# Patient Record
Sex: FEMALE | Race: BLACK OR AFRICAN AMERICAN | NOT HISPANIC OR LATINO | Employment: FULL TIME | URBAN - METROPOLITAN AREA
[De-identification: names, ages, dates, MRNs, and addresses within clinical notes are randomized per-mention and may not be internally consistent; named-entity substitution may affect disease eponyms.]

---

## 2022-03-03 ENCOUNTER — CLINICAL SUPPORT (OUTPATIENT)
Dept: FAMILY MEDICINE CLINIC | Facility: CLINIC | Age: 22
End: 2022-03-03

## 2022-03-03 DIAGNOSIS — Z23 IMMUNIZATION DUE: Primary | ICD-10-CM

## 2022-03-03 PROCEDURE — 0001A COVID-19 (PFIZER) 12+ YRS: CPT | Performed by: FAMILY MEDICINE

## 2022-03-03 PROCEDURE — 91305 COVID-19 (PFIZER) 12+ YRS: CPT | Performed by: FAMILY MEDICINE

## 2022-03-24 ENCOUNTER — IMMUNIZATION (OUTPATIENT)
Dept: FAMILY MEDICINE CLINIC | Facility: CLINIC | Age: 22
End: 2022-03-24

## 2022-03-24 DIAGNOSIS — Z23 IMMUNIZATION DUE: Primary | ICD-10-CM

## 2022-03-24 PROCEDURE — 91305 COVID-19 (PFIZER) 12+ YRS: CPT | Performed by: FAMILY MEDICINE

## 2022-03-24 PROCEDURE — 0052A COVID-19 (PFIZER) 12+ YRS: CPT | Performed by: FAMILY MEDICINE

## 2022-07-19 PROCEDURE — 87661 TRICHOMONAS VAGINALIS AMPLIF: CPT | Performed by: FAMILY MEDICINE

## 2022-07-19 PROCEDURE — 87491 CHLMYD TRACH DNA AMP PROBE: CPT | Performed by: FAMILY MEDICINE

## 2022-07-19 PROCEDURE — 87798 DETECT AGENT NOS DNA AMP: CPT | Performed by: FAMILY MEDICINE

## 2022-07-19 PROCEDURE — 87591 N.GONORRHOEAE DNA AMP PROB: CPT | Performed by: FAMILY MEDICINE

## 2022-07-19 PROCEDURE — 87801 DETECT AGNT MULT DNA AMPLI: CPT | Performed by: FAMILY MEDICINE

## 2024-01-21 PROBLEM — Z72.51 HIGH RISK HETEROSEXUAL BEHAVIOR: Status: ACTIVE | Noted: 2024-01-21

## 2024-02-27 ENCOUNTER — OFFICE VISIT (OUTPATIENT)
Dept: FAMILY MEDICINE CLINIC | Facility: CLINIC | Age: 24
End: 2024-02-27
Payer: COMMERCIAL

## 2024-02-27 ENCOUNTER — LAB (OUTPATIENT)
Dept: LAB | Facility: HOSPITAL | Age: 24
End: 2024-02-27
Payer: COMMERCIAL

## 2024-02-27 VITALS
SYSTOLIC BLOOD PRESSURE: 110 MMHG | DIASTOLIC BLOOD PRESSURE: 62 MMHG | HEIGHT: 65 IN | BODY MASS INDEX: 23.03 KG/M2 | HEART RATE: 98 BPM | WEIGHT: 138.2 LBS | TEMPERATURE: 98.2 F | OXYGEN SATURATION: 98 %

## 2024-02-27 DIAGNOSIS — Z11.3 SCREENING EXAMINATION FOR STD (SEXUALLY TRANSMITTED DISEASE): Primary | ICD-10-CM

## 2024-02-27 DIAGNOSIS — F33.2 SEVERE EPISODE OF RECURRENT MAJOR DEPRESSIVE DISORDER, WITHOUT PSYCHOTIC FEATURES: ICD-10-CM

## 2024-02-27 DIAGNOSIS — Z11.3 SCREENING EXAMINATION FOR STD (SEXUALLY TRANSMITTED DISEASE): ICD-10-CM

## 2024-02-27 LAB
DEPRECATED RDW RBC AUTO: 40.2 FL (ref 37–54)
ERYTHROCYTE [DISTWIDTH] IN BLOOD BY AUTOMATED COUNT: 12.4 % (ref 12.3–15.4)
HCT VFR BLD AUTO: 35.7 % (ref 34–46.6)
HGB BLD-MCNC: 12.1 G/DL (ref 12–15.9)
MCH RBC QN AUTO: 30.3 PG (ref 26.6–33)
MCHC RBC AUTO-ENTMCNC: 33.9 G/DL (ref 31.5–35.7)
MCV RBC AUTO: 89.3 FL (ref 79–97)
PLATELET # BLD AUTO: 243 10*3/MM3 (ref 140–450)
PMV BLD AUTO: 10.8 FL (ref 6–12)
RBC # BLD AUTO: 4 10*6/MM3 (ref 3.77–5.28)
WBC NRBC COR # BLD AUTO: 7 10*3/MM3 (ref 3.4–10.8)

## 2024-02-27 PROCEDURE — 87798 DETECT AGENT NOS DNA AMP: CPT | Performed by: FAMILY MEDICINE

## 2024-02-27 PROCEDURE — 86695 HERPES SIMPLEX TYPE 1 TEST: CPT

## 2024-02-27 PROCEDURE — 86592 SYPHILIS TEST NON-TREP QUAL: CPT

## 2024-02-27 PROCEDURE — 84443 ASSAY THYROID STIM HORMONE: CPT

## 2024-02-27 PROCEDURE — 85027 COMPLETE CBC AUTOMATED: CPT

## 2024-02-27 PROCEDURE — 80053 COMPREHEN METABOLIC PANEL: CPT

## 2024-02-27 PROCEDURE — 86696 HERPES SIMPLEX TYPE 2 TEST: CPT

## 2024-02-27 PROCEDURE — 87591 N.GONORRHOEAE DNA AMP PROB: CPT | Performed by: FAMILY MEDICINE

## 2024-02-27 PROCEDURE — 87491 CHLMYD TRACH DNA AMP PROBE: CPT | Performed by: FAMILY MEDICINE

## 2024-02-27 PROCEDURE — 36415 COLL VENOUS BLD VENIPUNCTURE: CPT

## 2024-02-27 PROCEDURE — 80074 ACUTE HEPATITIS PANEL: CPT

## 2024-02-27 PROCEDURE — G0432 EIA HIV-1/HIV-2 SCREEN: HCPCS

## 2024-02-27 PROCEDURE — 87661 TRICHOMONAS VAGINALIS AMPLIF: CPT | Performed by: FAMILY MEDICINE

## 2024-02-27 PROCEDURE — 87801 DETECT AGNT MULT DNA AMPLI: CPT | Performed by: FAMILY MEDICINE

## 2024-02-27 NOTE — ASSESSMENT & PLAN NOTE
Will obtain STI screening today with new swab and laboratory evaluation.  Patient denies any active symptoms.

## 2024-02-27 NOTE — ASSESSMENT & PLAN NOTE
Patient's depression is a recurrent episode that is severe without psychosis. Depression is active and newly identified.    Plan:   Discussed medication and talk therapy options, patient declines at this time.  Patient denies any active suicidal ideation at this time.  Will provide patient with information for crisis line.    Followup in 3 months.

## 2024-02-27 NOTE — PROGRESS NOTES
New Patient Office Visit      Date: 2024   Patient Name: Estefania Giraldo  : 2000   MRN: [id number]     Chief Complaint:    Chief Complaint   Patient presents with    Establish Care    Exposure to STD       History of Present Illness: Estefania Giraldo is a 23 y.o. female who is here today to establish care.  She currently lives at home with her sister.  She reports she is currently unemployed.    Patient presents today for STI screening.  Patient does have a history of testing positive for bacterial vaginosis, gonorrhea back in September.  She did complete treatment that time and had negative testing completed in January.  She states that she has had no symptoms since that time but she does remain sexually active and would like rescreening today.  She denies any active lesions.    On intake screening patient did flag positive for suicidal thoughts or ideations.  Discussed further with patient at this visit.  She states that she is currently having ups and downs with her relationship with her mother.  She states at times able to cause her stress.  Discussed options including medication or talk therapy, patient declines these at this time.  Further discussed crisis line with patient and availability for  care if suicidal ideation were to worsen, patient voiced understanding.    Patient denies any family history of cancer.  She states she is up-to-date on Pap smear.  She reports [facility]  She states at that time she has had only spotting and irregular periods.  She is a  and had a full-term vaginal delivery 2 years ago.  She reports she did have some low iron during pregnancy but no hypertension or hypoglycemia.  Patient does have tattoos.  She declines HPV vaccine today.    Subjective      Review of Systems:   Review of Systems   All other systems reviewed and are negative.      Past Medical History:   Past Medical History:   Diagnosis  "Date    Gonorrhea 2023    Trichomonal vaginitis 2023       Past Surgical History: History reviewed. No pertinent surgical history.    Family History:   Family History   Family history unknown: Yes       Social History:   Social History     Socioeconomic History    Marital status: Single   Tobacco Use    Smoking status: Never    Smokeless tobacco: Never   Vaping Use    Vaping Use: Never used   Substance and Sexual Activity    Alcohol use: Yes    Drug use: Yes     Types: Marijuana    Sexual activity: Defer       Medications:   No current outpatient medications on file.    Allergies:   No Known Allergies    Objective     Physical Exam:  Vital Signs:   Vitals:    02/27/24 1415   BP: 110/62   Pulse: 98   Temp: 98.2 °F (36.8 °C)   TempSrc: Temporal   SpO2: 98%   Weight: 62.7 kg (138 lb 3.2 oz)   Height: 165.1 cm (65\")   PainSc: 0-No pain     Body mass index is 23 kg/m².  BMI is within normal parameters. No other follow-up for BMI required.       Physical Exam  Vitals and nursing note reviewed.   Constitutional:       Appearance: Normal appearance. She is normal weight.   HENT:      Head: Normocephalic and atraumatic.      Nose: Nose normal.      Mouth/Throat:      Mouth: Mucous membranes are moist.   Eyes:      Extraocular Movements: Extraocular movements intact.      Pupils: Pupils are equal, round, and reactive to light.   Cardiovascular:      Rate and Rhythm: Normal rate and regular rhythm.      Heart sounds: Normal heart sounds.   Pulmonary:      Effort: Pulmonary effort is normal.      Breath sounds: Normal breath sounds.   Abdominal:      General: Abdomen is flat.   Musculoskeletal:         General: Normal range of motion.      Cervical back: Normal range of motion.   Skin:     General: Skin is warm.   Neurological:      General: No focal deficit present.      Mental Status: She is alert and oriented to person, place, and time.   Psychiatric:         Mood and Affect: Mood normal.         Behavior: Behavior normal.   "       Thought Content: Thought content normal.         Judgment: Judgment normal.         Procedures    Results:   PHQ-9 Total Score: 15            Assessment / Plan      Assessment/Plan:   Diagnoses and all orders for this visit:    1. Screening examination for STD (sexually transmitted disease) (Primary)  Assessment & Plan:  Will obtain STI screening today with new swab and laboratory evaluation.  Patient denies any active symptoms.    Orders:  -     Hepatitis panel, acute; Future  -     HIV-1/O/2 ANTIGEN/ANTIBODY, 4TH GENERATION; Future  -     RPR; Future  -     NuSwab VG+ - Swab, Vagina; Future  -     CBC (No Diff); Future  -     Comprehensive Metabolic Panel; Future  -     TSH; Future  -     NuSwab VG+ - Swab, Vagina    2. Severe episode of recurrent major depressive disorder, without psychotic features  Assessment & Plan:  Patient's depression is a recurrent episode that is severe without psychosis. Depression is active and newly identified.    Plan:   Discussed medication and talk therapy options, patient declines at this time.  Patient denies any active suicidal ideation at this time.  Will provide patient with information for crisis line.    Followup in 3 months.            Follow Up:   Return in about 3 months (around 5/27/2024) for Annual.    Twila Rojas DO   [facility]

## 2024-02-28 ENCOUNTER — TELEPHONE (OUTPATIENT)
Dept: FAMILY MEDICINE CLINIC | Facility: CLINIC | Age: 24
End: 2024-02-28
Payer: COMMERCIAL

## 2024-02-28 DIAGNOSIS — Z11.3 SCREENING EXAMINATION FOR STD (SEXUALLY TRANSMITTED DISEASE): Primary | ICD-10-CM

## 2024-02-28 LAB
ALBUMIN SERPL-MCNC: 4.6 G/DL (ref 3.5–5.2)
ALBUMIN/GLOB SERPL: 1.5 G/DL
ALP SERPL-CCNC: 49 U/L (ref 39–117)
ALT SERPL W P-5'-P-CCNC: 14 U/L (ref 1–33)
ANION GAP SERPL CALCULATED.3IONS-SCNC: 12.7 MMOL/L (ref 5–15)
AST SERPL-CCNC: 17 U/L (ref 1–32)
BILIRUB SERPL-MCNC: 0.3 MG/DL (ref 0–1.2)
BUN SERPL-MCNC: 8 MG/DL (ref 6–20)
BUN/CREAT SERPL: 11.3 (ref 7–25)
CALCIUM SPEC-SCNC: 9.4 MG/DL (ref 8.6–10.5)
CHLORIDE SERPL-SCNC: 103 MMOL/L (ref 98–107)
CO2 SERPL-SCNC: 21.3 MMOL/L (ref 22–29)
CREAT SERPL-MCNC: 0.71 MG/DL (ref 0.57–1)
EGFRCR SERPLBLD CKD-EPI 2021: 123.5 ML/MIN/1.73
GLOBULIN UR ELPH-MCNC: 3.1 GM/DL
GLUCOSE SERPL-MCNC: 73 MG/DL (ref 65–99)
HAV IGM SERPL QL IA: NORMAL
HBV CORE IGM SERPL QL IA: NORMAL
HBV SURFACE AG SERPL QL IA: NORMAL
HCV AB SER DONR QL: NORMAL
HIV 1+2 AB+HIV1 P24 AG SERPL QL IA: NORMAL
POTASSIUM SERPL-SCNC: 3.8 MMOL/L (ref 3.5–5.2)
PROT SERPL-MCNC: 7.7 G/DL (ref 6–8.5)
RPR SER QL: NORMAL
SODIUM SERPL-SCNC: 137 MMOL/L (ref 136–145)
TSH SERPL DL<=0.05 MIU/L-ACNC: 0.42 UIU/ML (ref 0.27–4.2)

## 2024-02-28 NOTE — TELEPHONE ENCOUNTER
Caller: Estefania Giraldo    Relationship: Self    Best call back number: [phone]    Caller requesting test results: SELF    What test was performed: LABS    When was the test performed: 02/27/2024    Additional notes: PATIENT STATED SHE WANTED TO SPECIFICALLY TEST FOR HERPES AND DID NOT SEE THE RESULTS IN MY CHART. PLEASE ADVISE

## 2024-02-28 NOTE — TELEPHONE ENCOUNTER
As patient was asymptomatic during exam, routine screening for Herpes was not indicated.  If she is not having an active flare this will likely not show on the [facility].  If she would like to have a blood test to show if she has had a past exposure, I can order this.  I have placed a standing order in her chart and she can come have this blood work done at her convenience.  Unfortunately I am unable to add it to her previous lab draw.

## 2024-02-29 ENCOUNTER — PATIENT ROUNDING (BHMG ONLY) (OUTPATIENT)
Dept: FAMILY MEDICINE CLINIC | Facility: CLINIC | Age: 24
End: 2024-02-29
Payer: COMMERCIAL

## 2024-02-29 ENCOUNTER — TELEPHONE (OUTPATIENT)
Dept: FAMILY MEDICINE CLINIC | Facility: CLINIC | Age: 24
End: 2024-02-29
Payer: COMMERCIAL

## 2024-02-29 NOTE — TELEPHONE ENCOUNTER
Advised patient again that the lab orders are already in the chart she would have to stop back by the lab. No further questions or concerns.

## 2024-02-29 NOTE — TELEPHONE ENCOUNTER
Caller: Estefania Giraldo    Relationship: Self    Best call back number: [phone]    What orders are you requesting (i.e. lab or imaging): HERPES    In what timeframe would the patient need to come in: ASAP    Where will you receive your lab/imaging services: IN OFFICE    Additional notes: PATIENT WOULD LIKE TO BE TESTED FOR HERPES. PLEASE PLACE ORDER AND CALL PATIENT BACK

## 2024-03-01 LAB
A VAGINAE DNA VAG QL NAA+PROBE: ABNORMAL SCORE
BVAB2 DNA VAG QL NAA+PROBE: ABNORMAL SCORE
C ALBICANS DNA VAG QL NAA+PROBE: NEGATIVE
C GLABRATA DNA VAG QL NAA+PROBE: NEGATIVE
C TRACH DNA VAG QL NAA+PROBE: NEGATIVE
MEGA1 DNA VAG QL NAA+PROBE: ABNORMAL SCORE
N GONORRHOEA DNA VAG QL NAA+PROBE: POSITIVE
T VAGINALIS DNA VAG QL NAA+PROBE: NEGATIVE

## 2024-03-02 ENCOUNTER — TELEPHONE (OUTPATIENT)
Dept: FAMILY MEDICINE CLINIC | Facility: CLINIC | Age: 24
End: 2024-03-02
Payer: COMMERCIAL

## 2024-03-02 LAB
HSV1 IGG SER IA-ACNC: <0.91 INDEX (ref 0–0.9)
HSV2 IGG SER IA-ACNC: 12.7 INDEX (ref 0–0.9)

## 2024-03-02 NOTE — TELEPHONE ENCOUNTER
Per Dr Griffiths she reviewed pts lab results. And pt was informed that we will put in orders on Monday.

## 2024-03-04 ENCOUNTER — CLINICAL SUPPORT (OUTPATIENT)
Dept: FAMILY MEDICINE CLINIC | Facility: CLINIC | Age: 24
End: 2024-03-04
Payer: COMMERCIAL

## 2024-03-04 DIAGNOSIS — Z72.51 HIGH RISK HETEROSEXUAL BEHAVIOR: Primary | ICD-10-CM

## 2024-03-04 DIAGNOSIS — N76.0 BACTERIAL VAGINOSIS: ICD-10-CM

## 2024-03-04 DIAGNOSIS — A54.9 GONORRHEA: Primary | ICD-10-CM

## 2024-03-04 DIAGNOSIS — B96.89 BACTERIAL VAGINOSIS: ICD-10-CM

## 2024-03-04 RX ORDER — CEFTRIAXONE 1 G/1
1 INJECTION, POWDER, FOR SOLUTION INTRAMUSCULAR; INTRAVENOUS ONCE
Status: COMPLETED | OUTPATIENT
Start: 2024-03-04 | End: 2024-03-04

## 2024-03-04 RX ORDER — METRONIDAZOLE 500 MG/1
500 TABLET ORAL 2 TIMES DAILY
Qty: 14 TABLET | Refills: 0 | Status: SHIPPED | OUTPATIENT
Start: 2024-03-04 | End: 2024-03-11

## 2024-03-04 RX ADMIN — CEFTRIAXONE 1 G: 1 INJECTION, POWDER, FOR SOLUTION INTRAMUSCULAR; INTRAVENOUS at 12:58

## 2024-03-07 ENCOUNTER — TELEPHONE (OUTPATIENT)
Dept: FAMILY MEDICINE CLINIC | Facility: CLINIC | Age: 24
End: 2024-03-07
Payer: COMMERCIAL

## 2024-03-07 NOTE — TELEPHONE ENCOUNTER
Caller: Estefania Giraldo     Relationship:     Best call back number: [phone]    What is your medical concern? HERPES/TEST RESULTS    How long has this issue been going on? SAW RESULTS MYCHART    Is your provider already aware of this issue? NOT SURE IF SHE'S SEEN THEM.    Have you been treated for this issue? NO. PLEASE CALL ASAP. PATIENT IS VERY CONCERNED.

## 2024-03-07 NOTE — TELEPHONE ENCOUNTER
Called patient back and discussed results.  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  As patient is not having any current symptoms, would not recommend antiviral treatment at this time.  Advised patient if she were to have a flare of symptoms to reach out to be in antiviral treatment.  Unfortunately, patient had no prior HSV screening so unable to inform her when exposure occurred.  Did confirm with patient that she did complete treatment for gonorrhea and she is currently completing a course of Flagyl for bacterial vaginosis.  Advised patient if symptoms persist once completing oral antibiotics to return to clinic for repeat testing.  If patient does remain asymptomatic, advised patient to return to clinic in 3 months for follow-up testing.

## 2025-04-30 PROCEDURE — 87591 N.GONORRHOEAE DNA AMP PROB: CPT | Performed by: FAMILY MEDICINE

## 2025-04-30 PROCEDURE — 87661 TRICHOMONAS VAGINALIS AMPLIF: CPT | Performed by: FAMILY MEDICINE

## 2025-04-30 PROCEDURE — 87801 DETECT AGNT MULT DNA AMPLI: CPT | Performed by: FAMILY MEDICINE

## 2025-04-30 PROCEDURE — 87798 DETECT AGENT NOS DNA AMP: CPT | Performed by: FAMILY MEDICINE

## 2025-04-30 PROCEDURE — 87491 CHLMYD TRACH DNA AMP PROBE: CPT | Performed by: FAMILY MEDICINE

## 2025-05-05 ENCOUNTER — RESULTS FOLLOW-UP (OUTPATIENT)
Dept: URGENT CARE | Facility: CLINIC | Age: 25
End: 2025-05-05

## 2025-05-05 NOTE — TELEPHONE ENCOUNTER
Spoke with patient, she stated [phone] is the best # to reach her.  She understands and is good to go on the Rx for Tx.  Pharmacy also confirmed.    ----- Message from Maico Parry sent at 5/4/2025  9:15 AM EDT -----  Please try to get in contact with the patient.  I called but could not get through.  Please try other methods that we have at our disposal so we can contact the patient about positive trichomonas and   chlamydia.  She will need treatment for this which I can discuss.  ----- Message -----  From: Lab, Background User  Sent: 5/3/2025   7:09 PM EDT  To:  [address] Asap Post Discharge Resul#

## 2025-05-07 ENCOUNTER — TELEPHONE (OUTPATIENT)
Dept: URGENT CARE | Facility: CLINIC | Age: 25
End: 2025-05-07

## 2025-05-07 DIAGNOSIS — A74.9 CHLAMYDIA: Primary | ICD-10-CM

## 2025-05-07 DIAGNOSIS — B96.89 BACTERIAL VAGINOSIS: ICD-10-CM

## 2025-05-07 DIAGNOSIS — A59.9 TRICHOMONAS INFECTION: ICD-10-CM

## 2025-05-07 DIAGNOSIS — N76.0 BACTERIAL VAGINOSIS: ICD-10-CM

## 2025-05-07 RX ORDER — METRONIDAZOLE 500 MG/1
500 TABLET ORAL 2 TIMES DAILY
Qty: 14 TABLET | Refills: 0 | Status: SHIPPED | OUTPATIENT
Start: 2025-05-07 | End: 2025-05-14

## 2025-05-07 RX ORDER — DOXYCYCLINE 100 MG/1
100 CAPSULE ORAL 2 TIMES DAILY
Qty: 14 CAPSULE | Refills: 0 | Status: SHIPPED | OUTPATIENT
Start: 2025-05-07 | End: 2025-05-14

## 2025-05-07 NOTE — TELEPHONE ENCOUNTER
Patient states recent testing indicates positive social infection result and she wants to know if and when medication will be sent to pharmacy for treatment.  Please advise.

## 2025-05-07 NOTE — TELEPHONE ENCOUNTER
Spoke with patient.  Advised her of positive trichomonas, chlamydia and BV diagnosis.  Patient has no drug allergies that she knows of.  Last menstrual cycle was 5 days ago and she is not concerned for pregnancy.  Will send doxycycline and metronidazole to the pharmacy.  Advised of increased risk for sunburn with doxycycline, and to avoid metronidazole and alcohol together.  Advised to follow-up in 4 weeks for repeat testing.  Also advised to inform all sexual partners so they can be adequately treated.